# Patient Record
Sex: MALE | Race: WHITE | Employment: STUDENT | URBAN - METROPOLITAN AREA
[De-identification: names, ages, dates, MRNs, and addresses within clinical notes are randomized per-mention and may not be internally consistent; named-entity substitution may affect disease eponyms.]

---

## 2017-04-01 ENCOUNTER — HOSPITAL ENCOUNTER (OUTPATIENT)
Age: 32
Discharge: HOME | End: 2017-04-01
Attending: EMERGENCY MEDICINE
Payer: COMMERCIAL

## 2017-04-01 ENCOUNTER — APPOINTMENT (OUTPATIENT)
Dept: GENERAL RADIOLOGY | Age: 32
End: 2017-04-01
Attending: EMERGENCY MEDICINE
Payer: COMMERCIAL

## 2017-04-01 VITALS
DIASTOLIC BLOOD PRESSURE: 73 MMHG | TEMPERATURE: 99 F | OXYGEN SATURATION: 99 % | HEART RATE: 91 BPM | SYSTOLIC BLOOD PRESSURE: 141 MMHG | RESPIRATION RATE: 18 BRPM | HEIGHT: 78 IN | BODY MASS INDEX: 28.35 KG/M2 | WEIGHT: 245 LBS

## 2017-04-01 DIAGNOSIS — S63.592A DRUJ (DISTAL RADIOULNAR JOINT) SPRAIN, LEFT, INITIAL ENCOUNTER: Primary | ICD-10-CM

## 2017-04-01 PROCEDURE — 99203 OFFICE O/P NEW LOW 30 MIN: CPT

## 2017-04-01 PROCEDURE — 73110 X-RAY EXAM OF WRIST: CPT

## 2017-04-01 NOTE — ED INITIAL ASSESSMENT (HPI)
Patient was playing basketball on Sunday and sustained a wrist/forearm injury. Pain with movement in the left wrist with radiation up to the forearm. No swelling. No bruising.

## 2017-04-01 NOTE — ED PROVIDER NOTES
Patient Seen in: [address]    History   Patient presents with:  Musculoskeletal Problem    Stated Complaint: Pinching feeling on part of arm-intermittent    HPI    Vision is a 26-year-old male without significant past m surface of the distal forearm between the radius and the ulna  Full range of motion of the wrist intact  Pain with supination and pronation against resistance  Nontender elbow, upper arm, shoulder  No swelling or crepitance over the tendons    ED Course

## (undated) NOTE — ED AVS SNAPSHOT
[facility] in [place] [address]    Phone:  [address]   MRN: [id number]    Department:  [facility] in [facility]   Date of Visit:  4/1/2017 service to you, we would appreciate any positive or negative feedback related to the care you received in our Immediate Care. Please call our [address] at [phone]. Your Immediate Care team is here to serve you. You are our top priority.     You w [address] [facility]  WEEKENDS AND HOLIDAYS 8AM - 6PM    I certified that I have received a copy of the aftercare instructions; that these instructions have been explained to me; all questions pertaining to these instructions have been answered information, go to [email or web address] and click on the Sign Up Now link in the [employer] box. Enter your [employer] Activation Code exactly as it appears below along with your Zip Code and Date of Birth to complete the sign-up process.  If you do